# Patient Record
Sex: MALE | Race: WHITE | Employment: FULL TIME | URBAN - NONMETROPOLITAN AREA
[De-identification: names, ages, dates, MRNs, and addresses within clinical notes are randomized per-mention and may not be internally consistent; named-entity substitution may affect disease eponyms.]

---

## 2021-05-24 ENCOUNTER — OFFICE VISIT (OUTPATIENT)
Dept: FAMILY MEDICINE CLINIC | Age: 46
End: 2021-05-24

## 2021-05-24 DIAGNOSIS — S86.112A GASTROCNEMIUS TEAR, LEFT, INITIAL ENCOUNTER: Primary | ICD-10-CM

## 2021-05-24 NOTE — PROGRESS NOTES
Subjective: Hedy Zaman is a 39 y.o. male who was seen for No chief complaint on file. HPI   Patient comes in after feeling a sudden pain and popping in his left calf. Occurred while he was playing soccer last Saturday. He is otherwise physically active and has never had a tear or injury of that leg. Since then his pain is improved slightly but he has pain on any pushing his toe down. He is noted no bruising no swelling. New neurological changes. Otherwise the rest of his leg is been fine without any pain or swelling. Patient comes in particular to see if he needs an MRI or what further therapy may be necessary. Home Medications    Not on File      Not on File  Social History     Tobacco Use    Smoking status: Not on file   Substance Use Topics    Alcohol use: Not on file    Drug use: Not on file            Review of Systems   Negative  Physical Exam exam of his left leg reveals pain over the medial aspect of the gastrocnemias muscle of his left leg. He has actually only a portion of that that is tender. His Achilles tendon is normal and is intact. He has full range of motion of the leg but has pain on pointing medially. Is rest of exam reveals no swelling bruising or pain at other parts of the leg or other lateral aspect of the gastrocnemius. Patient is able to walk with some difficulty when he points his toe down. Objective: There were no vitals taken for this visit. alert, cooperative, no distress   normal mood, behavior, speech, dress, motor activity, and thought processes, able to follow commands          Assessment & Plan:     1. Gastrocnemius tear, left, initial encounter  This time we will simply observe he has a boot already that he can wear he is already talked to a foot and ankle surgeon. Will need to watch this carefully possibility of physical therapy in the next 2 weeks after staying off of this for at least the last 2 weeks.   We talked about further physical therapy at that point. No need for an MRI now since her treatment will likely be the same since this looks like a simple tear. [id number]    Additional exam findings: We discussed the expected course, resolution and complications of the diagnosis(es) in detail. Medication risks, benefits, costs, interactions, and alternatives were discussed as indicated. I advised him to contact the office if his condition worsens, changes or fails to improve as anticipated. He expressed understanding with the diagnosis(es) and plan.

## 2021-08-11 ENCOUNTER — TRANSCRIBE ORDER (OUTPATIENT)
Dept: SCHEDULING | Age: 46
End: 2021-08-11

## 2021-08-11 DIAGNOSIS — N20.0 KIDNEY STONES: Primary | ICD-10-CM

## 2021-08-11 DIAGNOSIS — N20.0 STONE, KIDNEY: Primary | ICD-10-CM

## 2021-08-13 ENCOUNTER — HOSPITAL ENCOUNTER (OUTPATIENT)
Dept: ULTRASOUND IMAGING | Age: 46
Discharge: HOME | End: 2021-08-13
Attending: INTERNAL MEDICINE
Payer: COMMERCIAL

## 2021-08-13 DIAGNOSIS — N20.0 KIDNEY STONES: ICD-10-CM

## 2021-08-13 PROCEDURE — 76770 US EXAM ABDO BACK WALL COMP: CPT

## 2021-08-16 ENCOUNTER — TRANSCRIBE ORDER (OUTPATIENT)
Dept: SCHEDULING | Age: 46
End: 2021-08-16

## 2021-08-16 DIAGNOSIS — N20.0 KIDNEY STONE: Primary | ICD-10-CM

## 2024-09-27 ENCOUNTER — HOSPITAL ENCOUNTER (OUTPATIENT)
Age: 49
Discharge: HOME | End: 2024-09-30
Payer: COMMERCIAL

## 2024-09-27 DIAGNOSIS — N20.0 KIDNEY STONES: ICD-10-CM

## 2024-09-27 PROCEDURE — 76770 US EXAM ABDO BACK WALL COMP: CPT

## 2025-01-30 ENCOUNTER — HOSPITAL ENCOUNTER (OUTPATIENT)
Age: 50
Discharge: HOME | End: 2025-01-30
Payer: COMMERCIAL

## 2025-01-30 ENCOUNTER — HOSPITAL ENCOUNTER (OUTPATIENT)
Age: 50
End: 2025-01-30
Payer: COMMERCIAL

## 2025-01-30 DIAGNOSIS — N20.0 KIDNEY STONE: ICD-10-CM

## 2025-01-30 LAB
APPEARANCE UR: CLEAR
BACTERIA URNS QL MICRO: ABNORMAL /HPF
BILIRUB UR QL: NEGATIVE
COLOR UR: YELLOW
EPITH CASTS URNS QL MICRO: ABNORMAL /LPF (ref 0–20)
GLUCOSE UR STRIP.AUTO-MCNC: NEGATIVE MG/DL
HGB UR QL STRIP: ABNORMAL
KETONES UR QL STRIP.AUTO: ABNORMAL MG/DL
LEUKOCYTE ESTERASE UR QL STRIP.AUTO: ABNORMAL
MUCOUS THREADS URNS QL MICRO: ABNORMAL /LPF
NITRITE UR QL STRIP.AUTO: NEGATIVE
PH UR STRIP: 6.5 (ref 5–8)
PROT UR STRIP-MCNC: 30 MG/DL
RBC #/AREA URNS HPF: >100 /HPF (ref 0–2)
SP GR UR REFRACTOMETRY: 1.03 (ref 1–1.03)
UROBILINOGEN UR QL STRIP.AUTO: 0.2 EU/DL (ref 0.2–1)
WBC URNS QL MICRO: ABNORMAL /HPF (ref 0–4)

## 2025-01-30 PROCEDURE — 36415 COLL VENOUS BLD VENIPUNCTURE: CPT

## 2025-01-30 PROCEDURE — 87086 URINE CULTURE/COLONY COUNT: CPT

## 2025-01-30 PROCEDURE — 74018 RADEX ABDOMEN 1 VIEW: CPT

## 2025-01-30 PROCEDURE — 81001 URINALYSIS AUTO W/SCOPE: CPT

## 2025-01-31 LAB
BACTERIA SPEC CULT: NORMAL
Lab: NORMAL

## 2025-03-24 ENCOUNTER — HOSPITAL ENCOUNTER (OUTPATIENT)
Facility: HOSPITAL | Age: 50
Discharge: HOME | End: 2025-03-27
Payer: COMMERCIAL

## 2025-03-24 DIAGNOSIS — N20.0 KIDNEY STONES: ICD-10-CM

## 2025-03-24 PROCEDURE — 76770 US EXAM ABDO BACK WALL COMP: CPT
